# Patient Record
Sex: MALE | Race: WHITE | NOT HISPANIC OR LATINO | URBAN - METROPOLITAN AREA
[De-identification: names, ages, dates, MRNs, and addresses within clinical notes are randomized per-mention and may not be internally consistent; named-entity substitution may affect disease eponyms.]

---

## 2018-08-02 ENCOUNTER — OUTPATIENT (OUTPATIENT)
Dept: OUTPATIENT SERVICES | Facility: HOSPITAL | Age: 68
LOS: 1 days | End: 2018-08-02
Payer: COMMERCIAL

## 2018-08-02 DIAGNOSIS — D45 POLYCYTHEMIA VERA: ICD-10-CM

## 2018-08-02 PROCEDURE — 99195 PHLEBOTOMY: CPT

## 2018-08-09 ENCOUNTER — OUTPATIENT (OUTPATIENT)
Dept: OUTPATIENT SERVICES | Facility: HOSPITAL | Age: 68
LOS: 1 days | End: 2018-08-09
Payer: COMMERCIAL

## 2018-08-09 DIAGNOSIS — D45 POLYCYTHEMIA VERA: ICD-10-CM

## 2018-08-09 PROCEDURE — 99195 PHLEBOTOMY: CPT

## 2018-08-16 ENCOUNTER — OUTPATIENT (OUTPATIENT)
Dept: OUTPATIENT SERVICES | Facility: HOSPITAL | Age: 68
LOS: 1 days | End: 2018-08-16
Payer: COMMERCIAL

## 2018-08-16 DIAGNOSIS — D45 POLYCYTHEMIA VERA: ICD-10-CM

## 2018-08-16 PROCEDURE — 99195 PHLEBOTOMY: CPT

## 2018-08-23 ENCOUNTER — OUTPATIENT (OUTPATIENT)
Dept: OUTPATIENT SERVICES | Facility: HOSPITAL | Age: 68
LOS: 1 days | End: 2018-08-23
Payer: COMMERCIAL

## 2018-08-23 PROCEDURE — 99195 PHLEBOTOMY: CPT

## 2018-10-04 ENCOUNTER — OUTPATIENT (OUTPATIENT)
Dept: OUTPATIENT SERVICES | Facility: HOSPITAL | Age: 68
LOS: 1 days | End: 2018-10-04
Payer: COMMERCIAL

## 2018-10-04 DIAGNOSIS — D45 POLYCYTHEMIA VERA: ICD-10-CM

## 2018-10-04 PROCEDURE — 99195 PHLEBOTOMY: CPT

## 2018-11-01 ENCOUNTER — OUTPATIENT (OUTPATIENT)
Dept: OUTPATIENT SERVICES | Facility: HOSPITAL | Age: 68
LOS: 1 days | End: 2018-11-01
Payer: COMMERCIAL

## 2018-11-01 DIAGNOSIS — D45 POLYCYTHEMIA VERA: ICD-10-CM

## 2018-11-01 PROCEDURE — 99195 PHLEBOTOMY: CPT

## 2019-03-22 ENCOUNTER — OUTPATIENT (OUTPATIENT)
Dept: OUTPATIENT SERVICES | Facility: HOSPITAL | Age: 69
LOS: 1 days | End: 2019-03-22
Payer: COMMERCIAL

## 2019-03-22 DIAGNOSIS — D45 POLYCYTHEMIA VERA: ICD-10-CM

## 2019-03-22 PROCEDURE — 99195 PHLEBOTOMY: CPT

## 2019-03-28 ENCOUNTER — OUTPATIENT (OUTPATIENT)
Dept: OUTPATIENT SERVICES | Facility: HOSPITAL | Age: 69
LOS: 1 days | End: 2019-03-28
Payer: COMMERCIAL

## 2019-03-28 DIAGNOSIS — D45 POLYCYTHEMIA VERA: ICD-10-CM

## 2019-03-28 PROCEDURE — 99195 PHLEBOTOMY: CPT

## 2019-05-30 ENCOUNTER — APPOINTMENT (OUTPATIENT)
Dept: HEART AND VASCULAR | Facility: CLINIC | Age: 69
End: 2019-05-30
Payer: COMMERCIAL

## 2019-05-30 VITALS
HEART RATE: 112 BPM | DIASTOLIC BLOOD PRESSURE: 60 MMHG | SYSTOLIC BLOOD PRESSURE: 120 MMHG | OXYGEN SATURATION: 93 % | HEIGHT: 69.5 IN

## 2019-05-30 DIAGNOSIS — F17.200 NICOTINE DEPENDENCE, UNSPECIFIED, UNCOMPLICATED: ICD-10-CM

## 2019-05-30 DIAGNOSIS — G40.909 EPILEPSY, UNSPECIFIED, NOT INTRACTABLE, W/OUT STATUS EPILEPTICUS: ICD-10-CM

## 2019-05-30 PROCEDURE — 99204 OFFICE O/P NEW MOD 45 MIN: CPT

## 2019-05-30 PROCEDURE — 93000 ELECTROCARDIOGRAM COMPLETE: CPT

## 2019-05-30 RX ORDER — LEVETIRACETAM 500 MG/1
500 TABLET, FILM COATED ORAL
Refills: 0 | Status: ACTIVE | COMMUNITY
Start: 2019-05-30

## 2019-05-30 RX ORDER — TERAZOSIN 5 MG/1
5 CAPSULE ORAL
Qty: 90 | Refills: 3 | Status: ACTIVE | COMMUNITY
Start: 2019-05-30

## 2019-05-30 NOTE — HISTORY OF PRESENT ILLNESS
[FreeTextEntry1] : c/o chest heaviness with walking hills past 2 months\par pressure is center chest \par non radiating, +sob\par not noted with walking flat surfaces, worse stairs\par resoles with rest after about a minute

## 2019-05-30 NOTE — DISCUSSION/SUMMARY
[FreeTextEntry1] : exertional chest pain/ angina pectoris associated SOB for nuclear stress and echo to evaluate extent of ischemia/ structural heart disease

## 2019-05-30 NOTE — PHYSICAL EXAM
[General Appearance - Well Developed] : well developed [Normal Appearance] : normal appearance [Well Groomed] : well groomed [General Appearance - Well Nourished] : well nourished [No Deformities] : no deformities [General Appearance - In No Acute Distress] : no acute distress [Normal Conjunctiva] : the conjunctiva exhibited no abnormalities [Eyelids - No Xanthelasma] : the eyelids demonstrated no xanthelasmas [Normal Oral Mucosa] : normal oral mucosa [No Oral Pallor] : no oral pallor [No Oral Cyanosis] : no oral cyanosis [Normal Jugular Venous A Waves Present] : normal jugular venous A waves present [Normal Jugular Venous V Waves Present] : normal jugular venous V waves present [No Jugular Venous Flores A Waves] : no jugular venous flores A waves [Respiration, Rhythm And Depth] : normal respiratory rhythm and effort [Exaggerated Use Of Accessory Muscles For Inspiration] : no accessory muscle use [Auscultation Breath Sounds / Voice Sounds] : lungs were clear to auscultation bilaterally [Heart Rate And Rhythm] : heart rate and rhythm were normal [Heart Sounds] : normal S1 and S2 [Murmurs] : no murmurs present [Abdomen Soft] : soft [Abdomen Tenderness] : non-tender [Abdomen Mass (___ Cm)] : no abdominal mass palpated [Abnormal Walk] : normal gait [Gait - Sufficient For Exercise Testing] : the gait was sufficient for exercise testing [Nail Clubbing] : no clubbing of the fingernails [Cyanosis, Localized] : no localized cyanosis [Petechial Hemorrhages (___cm)] : no petechial hemorrhages [] : no ischemic changes [Oriented To Time, Place, And Person] : oriented to person, place, and time [Affect] : the affect was normal [Mood] : the mood was normal [No Anxiety] : not feeling anxious

## 2019-06-20 ENCOUNTER — APPOINTMENT (OUTPATIENT)
Dept: HEART AND VASCULAR | Facility: CLINIC | Age: 69
End: 2019-06-20
Payer: COMMERCIAL

## 2019-06-20 ENCOUNTER — APPOINTMENT (OUTPATIENT)
Dept: HEART AND VASCULAR | Facility: CLINIC | Age: 69
End: 2019-06-20

## 2019-06-20 DIAGNOSIS — E78.5 HYPERLIPIDEMIA, UNSPECIFIED: ICD-10-CM

## 2019-06-20 DIAGNOSIS — R06.02 SHORTNESS OF BREATH: ICD-10-CM

## 2019-06-20 PROCEDURE — 99214 OFFICE O/P EST MOD 30 MIN: CPT | Mod: 25

## 2019-06-20 PROCEDURE — 93015 CV STRESS TEST SUPVJ I&R: CPT

## 2019-06-20 PROCEDURE — 93306 TTE W/DOPPLER COMPLETE: CPT

## 2019-06-20 PROCEDURE — 78452 HT MUSCLE IMAGE SPECT MULT: CPT

## 2019-06-20 PROCEDURE — A9500: CPT

## 2019-06-20 NOTE — DISCUSSION/SUMMARY
[FreeTextEntry1] : CP/SOB- negative non invasive cardiac evaluation, results discussed\par Lipids- cont statin

## 2019-06-20 NOTE — PHYSICAL EXAM
[General Appearance - Well Developed] : well developed [Well Groomed] : well groomed [Normal Appearance] : normal appearance [General Appearance - Well Nourished] : well nourished [No Deformities] : no deformities [General Appearance - In No Acute Distress] : no acute distress [Normal Conjunctiva] : the conjunctiva exhibited no abnormalities [Eyelids - No Xanthelasma] : the eyelids demonstrated no xanthelasmas [Normal Oral Mucosa] : normal oral mucosa [No Oral Pallor] : no oral pallor [No Oral Cyanosis] : no oral cyanosis [Normal Jugular Venous A Waves Present] : normal jugular venous A waves present [Normal Jugular Venous V Waves Present] : normal jugular venous V waves present [No Jugular Venous Flores A Waves] : no jugular venous flores A waves [Exaggerated Use Of Accessory Muscles For Inspiration] : no accessory muscle use [Respiration, Rhythm And Depth] : normal respiratory rhythm and effort [Auscultation Breath Sounds / Voice Sounds] : lungs were clear to auscultation bilaterally [Heart Rate And Rhythm] : heart rate and rhythm were normal [Murmurs] : no murmurs present [Heart Sounds] : normal S1 and S2 [Abdomen Soft] : soft [Abdomen Tenderness] : non-tender [Abnormal Walk] : normal gait [Abdomen Mass (___ Cm)] : no abdominal mass palpated [Nail Clubbing] : no clubbing of the fingernails [Gait - Sufficient For Exercise Testing] : the gait was sufficient for exercise testing [Cyanosis, Localized] : no localized cyanosis [Petechial Hemorrhages (___cm)] : no petechial hemorrhages [] : no ischemic changes [Affect] : the affect was normal [Oriented To Time, Place, And Person] : oriented to person, place, and time [Mood] : the mood was normal [No Anxiety] : not feeling anxious

## 2019-06-27 ENCOUNTER — APPOINTMENT (OUTPATIENT)
Dept: PULMONOLOGY | Facility: CLINIC | Age: 69
End: 2019-06-27

## 2019-07-25 ENCOUNTER — APPOINTMENT (OUTPATIENT)
Age: 69
End: 2019-07-25
Payer: COMMERCIAL

## 2019-07-25 PROCEDURE — 94060 EVALUATION OF WHEEZING: CPT

## 2019-07-25 PROCEDURE — 94727 GAS DIL/WSHOT DETER LNG VOL: CPT

## 2019-07-25 PROCEDURE — 94729 DIFFUSING CAPACITY: CPT

## 2019-08-22 ENCOUNTER — OUTPATIENT (OUTPATIENT)
Dept: OUTPATIENT SERVICES | Facility: HOSPITAL | Age: 69
LOS: 1 days | End: 2019-08-22
Payer: COMMERCIAL

## 2019-08-22 DIAGNOSIS — D45 POLYCYTHEMIA VERA: ICD-10-CM

## 2019-08-22 PROCEDURE — 99195 PHLEBOTOMY: CPT

## 2019-10-24 ENCOUNTER — OUTPATIENT (OUTPATIENT)
Dept: OUTPATIENT SERVICES | Facility: HOSPITAL | Age: 69
LOS: 1 days | End: 2019-10-24
Payer: COMMERCIAL

## 2019-10-24 DIAGNOSIS — D45 POLYCYTHEMIA VERA: ICD-10-CM

## 2019-10-24 PROCEDURE — 99195 PHLEBOTOMY: CPT

## 2019-12-24 ENCOUNTER — OUTPATIENT (OUTPATIENT)
Dept: OUTPATIENT SERVICES | Facility: HOSPITAL | Age: 69
LOS: 1 days | End: 2019-12-24
Payer: MEDICARE

## 2019-12-24 DIAGNOSIS — D45 POLYCYTHEMIA VERA: ICD-10-CM

## 2019-12-24 PROCEDURE — 99195 PHLEBOTOMY: CPT

## 2020-11-06 DIAGNOSIS — Z78.9 OTHER SPECIFIED HEALTH STATUS: ICD-10-CM

## 2020-11-06 DIAGNOSIS — I10 ESSENTIAL (PRIMARY) HYPERTENSION: ICD-10-CM

## 2020-11-06 DIAGNOSIS — N39.44 NOCTURNAL ENURESIS: ICD-10-CM

## 2020-11-09 ENCOUNTER — APPOINTMENT (OUTPATIENT)
Dept: UROLOGY | Facility: CLINIC | Age: 70
End: 2020-11-09
Payer: MEDICARE

## 2020-11-09 DIAGNOSIS — R97.20 ELEVATED PROSTATE, SPECIFIC ANTIGEN [PSA]: ICD-10-CM

## 2020-11-09 DIAGNOSIS — R39.9 UNSPECIFIED SYMPTOMS AND SIGNS INVOLVING THE GENITOURINARY SYSTEM: ICD-10-CM

## 2020-11-09 DIAGNOSIS — N52.01 ERECTILE DYSFUNCTION DUE TO ARTERIAL INSUFFICIENCY: ICD-10-CM

## 2020-11-09 DIAGNOSIS — N40.0 BENIGN PROSTATIC HYPERPLASIA WITHOUT LOWER URINARY TRACT SYMPMS: ICD-10-CM

## 2020-11-09 DIAGNOSIS — K40.20 BILATERAL INGUINAL HERNIA, W/OUT OBSTRUCTION OR GANGRENE, NOT SPECIFIED AS RECURRENT: ICD-10-CM

## 2020-11-09 DIAGNOSIS — N39.41 URGE INCONTINENCE: ICD-10-CM

## 2020-11-09 DIAGNOSIS — R41.3 OTHER AMNESIA: ICD-10-CM

## 2020-11-09 DIAGNOSIS — Z87.898 PERSONAL HISTORY OF OTHER SPECIFIED CONDITIONS: ICD-10-CM

## 2020-11-09 PROCEDURE — 76857 US EXAM PELVIC LIMITED: CPT

## 2020-11-09 PROCEDURE — 99072 ADDL SUPL MATRL&STAF TM PHE: CPT

## 2020-11-09 PROCEDURE — 99215 OFFICE O/P EST HI 40 MIN: CPT | Mod: 25

## 2020-11-09 RX ORDER — ATORVASTATIN CALCIUM 10 MG/1
10 TABLET, FILM COATED ORAL
Refills: 0 | Status: DISCONTINUED | COMMUNITY
Start: 2019-05-30 | End: 2020-11-09

## 2020-11-09 RX ORDER — LEVETIRACETAM 750 MG/1
750 TABLET, FILM COATED ORAL
Qty: 60 | Refills: 0 | Status: ACTIVE | COMMUNITY
Start: 2020-10-14

## 2020-11-09 RX ORDER — HYDROXYUREA 500 MG/1
500 CAPSULE ORAL
Qty: 60 | Refills: 0 | Status: ACTIVE | COMMUNITY
Start: 2020-10-16

## 2020-11-09 RX ORDER — FLUTICASONE FUROATE AND VILANTEROL TRIFENATATE 100; 25 UG/1; UG/1
100-25 POWDER RESPIRATORY (INHALATION)
Qty: 60 | Refills: 0 | Status: ACTIVE | COMMUNITY
Start: 2020-11-07

## 2020-11-09 RX ORDER — ROSUVASTATIN CALCIUM 10 MG/1
10 TABLET, FILM COATED ORAL
Qty: 90 | Refills: 0 | Status: ACTIVE | COMMUNITY
Start: 2020-10-14

## 2020-11-09 RX ORDER — MONTELUKAST 10 MG/1
10 TABLET, FILM COATED ORAL
Qty: 30 | Refills: 0 | Status: ACTIVE | COMMUNITY
Start: 2020-10-14

## 2020-11-09 RX ORDER — TERAZOSIN 1 MG/1
1 CAPSULE ORAL
Qty: 60 | Refills: 0 | Status: ACTIVE | COMMUNITY
Start: 2020-08-14

## 2020-11-10 NOTE — LETTER BODY
[Dear  ___] : Dear  [unfilled], [Consult Letter:] : I had the pleasure of evaluating your patient, [unfilled]. [Please see my note below.] : Please see my note below. [Consult Closing:] : Thank you very much for allowing me to participate in the care of this patient.  If you have any questions, please do not hesitate to contact me. [Sincerely,] : Sincerely, [FreeTextEntry3] : Jeancarlos Doran MD, FACS

## 2020-11-10 NOTE — ADDENDUM
[FreeTextEntry1] : A portion of this note was written by [Aaron Gaona] on 11/06/2020 acting as a scribe for Dr. Doran.\par \par I have personally reviewed the chart and agree that the record accurately reflects my personal performance of the history, physical exam, assessment and plan.

## 2020-11-10 NOTE — ASSESSMENT
[FreeTextEntry1] : I discussed the findings and options with Mr. MONI GARCIA in detail.  Unfortunately it seems as if he is losing some of his memory as he does not recall his prior urologic history, which includes 2 prostate biopsies (2013 and 2016--both negative).\par \par His PSA is elevated but relatively stable.  Moreover the PSA density is low suggesting a more likely benign cause for the PSA elevation. I  advised that he obtain another prostate MRI and I will call him with that result.  If this shows an abnormality we may recommend a third prostate biopsy; otherwise, he should simply follow-up in 1 year (bladder sono, PSA).  A repeat PSA should be drawn in 6 months, as well.\par

## 2020-11-10 NOTE — HISTORY OF PRESENT ILLNESS
[FreeTextEntry1] : Mr. MONI GARCIA comes in today for his urologic follow-up.  He presents with moderate chronic lower urinary tract symptoms (obstructive and irritative) with nocturia x3.  He also reports infrequent episodes of urge incontinence.  He is continuing on 1mg Hytrin daily. \par Sono: 12cc PVR; 165cc prostate\par (PSAD: 0.087)\par \par Mr. Joes has longstanding erectile dysfunction but does not want any intervention for this.  \par \par PSAs: 11/3/20--14.48; 5/23/19--14.3(35%); 1/11/19--9.2; 6/21/18--8.0(40%); 11/02/17--12.2(36%); 6/25/15--8.4(44%)\par \par Prostate MRI: 6/20/15--Lt peripheral gland at apex measuring 0.7 x 1.1cm suspicious for prostate ca. BPH (volume 92cc)\par \par PET CT: 7/30/15--Enlarged prostate gland. Hypodense lesions within upper pole Rt kidney measuring 1.1 x 1.2cm and posterior upper Rt kidney measuring 0.7 x 1.1cm. No evidence of renal calculus or hydronephrosis. Bladder unremarkable.  The previously described left upper lobe nodule measuring 0.5 cm and the centrally calcified nodule in the right upper lobe of the lung measuring 0.6 cm are stable from 2013. These nodules require no further imaging.\par \par Prostate bx: 1/28/16--BPH; 9/30/13--Benign

## 2020-11-10 NOTE — PHYSICAL EXAM
[General Appearance - Well Developed] : well developed [General Appearance - Well Nourished] : well nourished [Normal Appearance] : normal appearance [Well Groomed] : well groomed [General Appearance - In No Acute Distress] : no acute distress [Abdomen Soft] : soft [Abdomen Tenderness] : non-tender [Abdomen Mass (___ Cm)] : no abdominal mass palpated [Costovertebral Angle Tenderness] : no ~M costovertebral angle tenderness [Urethral Meatus] : meatus normal [Penis Abnormality] : normal circumcised penis [Urinary Bladder Findings] : the bladder was normal on palpation [Scrotum] : the scrotum was normal [Epididymis] : the epididymides were normal [Testes Tenderness] : no tenderness of the testes [Testes Mass (___cm)] : there were no testicular masses [Prostate Tenderness] : the prostate was not tender [No Prostate Nodules] : no prostate nodules [Edema] : no peripheral edema [] : no respiratory distress [Respiration, Rhythm And Depth] : normal respiratory rhythm and effort [Exaggerated Use Of Accessory Muscles For Inspiration] : no accessory muscle use [Oriented To Time, Place, And Person] : oriented to person, place, and time [Affect] : the affect was normal [Mood] : the mood was normal [Not Anxious] : not anxious [Normal Station and Gait] : the gait and station were normal for the patient's age [No Focal Deficits] : no focal deficits [Sensation] : the sensory exam was normal to light touch and pinprick [No Palpable Adenopathy] : no palpable adenopathy [FreeTextEntry1] : Smooth, mildly asymmetric prostate (right greater than left)--chronic

## 2020-11-10 NOTE — HISTORY OF PRESENT ILLNESS
[FreeTextEntry1] : Mr. MONI GARCIA comes in today for his urologic follow-up.  He presents with moderate chronic lower urinary tract symptoms (obstructive and irritative) with nocturia x3.  He also reports infrequent episodes of urge incontinence.  He is continuing on 1mg Hytrin daily. \par Sono: 12cc PVR; 165cc prostate\par (PSAD: 0.087)\par \par Mr. Jose has longstanding erectile dysfunction but does not want any intervention for this.  \par \par PSAs: 11/3/20--14.48; 5/23/19--14.3(35%); 1/11/19--9.2; 6/21/18--8.0(40%); 11/02/17--12.2(36%); 6/25/15--8.4(44%)\par \par Prostate MRI: 6/20/15--Lt peripheral gland at apex measuring 0.7 x 1.1cm suspicious for prostate ca. BPH (volume 92cc)\par \par PET CT: 7/30/15--Enlarged prostate gland. Hypodense lesions within upper pole Rt kidney measuring 1.1 x 1.2cm and posterior upper Rt kidney measuring 0.7 x 1.1cm. No evidence of renal calculus or hydronephrosis. Bladder unremarkable.  The previously described left upper lobe nodule measuring 0.5 cm and the centrally calcified nodule in the right upper lobe of the lung measuring 0.6 cm are stable from 2013. These nodules require no further imaging.\par \par Prostate bx: 1/28/16--BPH; 9/30/13--Benign

## 2020-11-19 ENCOUNTER — NON-APPOINTMENT (OUTPATIENT)
Age: 70
End: 2020-11-19

## 2021-02-09 ENCOUNTER — APPOINTMENT (OUTPATIENT)
Dept: UROLOGY | Facility: CLINIC | Age: 71
End: 2021-02-09

## 2021-02-23 NOTE — ADDENDUM
[FreeTextEntry1] : A portion of this note was written by [Kike Bhagat] on 02/05/2021 acting as a scribe for Dr. Doran. \par \par I have personally reviewed the chart and agree that the record accurately reflects my personal performance of the history, physical exam, assessment, and plan.

## 2021-02-26 ENCOUNTER — APPOINTMENT (OUTPATIENT)
Dept: UROLOGY | Facility: CLINIC | Age: 71
End: 2021-02-26
